# Patient Record
Sex: MALE | Race: WHITE | Employment: FULL TIME | URBAN - METROPOLITAN AREA
[De-identification: names, ages, dates, MRNs, and addresses within clinical notes are randomized per-mention and may not be internally consistent; named-entity substitution may affect disease eponyms.]

---

## 2017-04-10 RX ORDER — PANTOPRAZOLE SODIUM 40 MG/1
TABLET, DELAYED RELEASE ORAL
Qty: 90 TABLET | Refills: 0 | Status: SHIPPED | OUTPATIENT
Start: 2017-04-10 | End: 2017-07-09 | Stop reason: SDUPTHER

## 2017-04-12 ENCOUNTER — TELEPHONE (OUTPATIENT)
Dept: FAMILY MEDICINE CLINIC | Age: 37
End: 2017-04-12

## 2017-04-12 ENCOUNTER — OFFICE VISIT (OUTPATIENT)
Dept: FAMILY MEDICINE CLINIC | Age: 37
End: 2017-04-12

## 2017-04-12 VITALS
BODY MASS INDEX: 28.85 KG/M2 | WEIGHT: 224.8 LBS | HEART RATE: 56 BPM | HEIGHT: 74 IN | SYSTOLIC BLOOD PRESSURE: 108 MMHG | DIASTOLIC BLOOD PRESSURE: 70 MMHG | RESPIRATION RATE: 16 BRPM

## 2017-04-12 DIAGNOSIS — Z00.00 WELL ADULT EXAM: Primary | ICD-10-CM

## 2017-04-12 DIAGNOSIS — F45.8 BRUXISM: ICD-10-CM

## 2017-04-12 DIAGNOSIS — Z86.718 HISTORY OF DVT (DEEP VEIN THROMBOSIS): ICD-10-CM

## 2017-04-12 DIAGNOSIS — G25.81 RLS (RESTLESS LEGS SYNDROME): ICD-10-CM

## 2017-04-12 DIAGNOSIS — M79.10 MYALGIA: Primary | ICD-10-CM

## 2017-04-12 PROCEDURE — 99213 OFFICE O/P EST LOW 20 MIN: CPT | Performed by: FAMILY MEDICINE

## 2017-04-12 RX ORDER — CYCLOBENZAPRINE HCL 10 MG
10 TABLET ORAL NIGHTLY PRN
Qty: 30 TABLET | Refills: 2 | Status: SHIPPED | OUTPATIENT
Start: 2017-04-12 | End: 2017-05-08

## 2017-04-12 ASSESSMENT — ENCOUNTER SYMPTOMS
RESPIRATORY NEGATIVE: 1
GASTROINTESTINAL NEGATIVE: 1
BACK PAIN: 1

## 2017-04-12 ASSESSMENT — PATIENT HEALTH QUESTIONNAIRE - PHQ9
SUM OF ALL RESPONSES TO PHQ QUESTIONS 1-9: 0
1. LITTLE INTEREST OR PLEASURE IN DOING THINGS: 0
2. FEELING DOWN, DEPRESSED OR HOPELESS: 0
SUM OF ALL RESPONSES TO PHQ9 QUESTIONS 1 & 2: 0

## 2017-05-08 ENCOUNTER — TELEPHONE (OUTPATIENT)
Dept: FAMILY MEDICINE CLINIC | Age: 37
End: 2017-05-08

## 2017-05-08 RX ORDER — TEMAZEPAM 15 MG/1
15 CAPSULE ORAL NIGHTLY PRN
Qty: 30 CAPSULE | Refills: 0 | Status: SHIPPED | OUTPATIENT
Start: 2017-05-08 | End: 2017-06-14 | Stop reason: SDUPTHER

## 2017-05-11 LAB
ALBUMIN SERPL-MCNC: 4.1 G/DL (ref 3.2–5.3)
ALK PHOSPHATASE: 81 IU/L (ref 35–121)
ALT SERPL-CCNC: 15 IU/L (ref 5–59)
ANION GAP SERPL CALCULATED.3IONS-SCNC: 15 MMOL/L
AST SERPL-CCNC: 20 IU/L (ref 10–42)
BILIRUB SERPL-MCNC: 1.1 MG/DL (ref 0.2–1.3)
BUN BLDV-MCNC: 12 MG/DL (ref 10–20)
CALCIUM SERPL-MCNC: 9.3 MG/DL (ref 8.7–10.8)
CHLORIDE BLD-SCNC: 102 MMOL/L (ref 95–111)
CHOLESTEROL/HDL RATIO: 3.5
CHOLESTEROL: 168 MG/DL
CO2: 27 MMOL/L (ref 21–32)
CREAT SERPL-MCNC: 0.9 MG/DL (ref 0.5–1.3)
EGFR AFRICAN AMERICAN: 115
EGFR IF NONAFRICAN AMERICAN: 95
GLUCOSE: 88 MG/DL (ref 70–100)
HDLC SERPL-MCNC: 48 MG/DL (ref 40–60)
LDL CHOLESTEROL CALCULATED: 101 MG/DL
LDL/HDL RATIO: 2.1
POTASSIUM SERPL-SCNC: 4.7 MMOL/L (ref 3.5–5.4)
SODIUM BLD-SCNC: 139 MMOL/L (ref 134–147)
TOTAL PROTEIN: 6.8 G/DL (ref 5.8–8)
TRIGL SERPL-MCNC: 95 MG/DL
TSH SERPL DL<=0.05 MIU/L-ACNC: 1.98 UIU/ML (ref 0.4–4.4)
VLDLC SERPL CALC-MCNC: 19 MG/DL

## 2017-06-14 ENCOUNTER — OFFICE VISIT (OUTPATIENT)
Dept: FAMILY MEDICINE CLINIC | Age: 37
End: 2017-06-14

## 2017-06-14 VITALS
SYSTOLIC BLOOD PRESSURE: 124 MMHG | HEIGHT: 74 IN | BODY MASS INDEX: 28.36 KG/M2 | WEIGHT: 221 LBS | HEART RATE: 68 BPM | RESPIRATION RATE: 16 BRPM | DIASTOLIC BLOOD PRESSURE: 80 MMHG

## 2017-06-14 DIAGNOSIS — Z00.00 WELL ADULT HEALTH CHECK: Primary | ICD-10-CM

## 2017-06-14 PROCEDURE — 99395 PREV VISIT EST AGE 18-39: CPT | Performed by: FAMILY MEDICINE

## 2017-06-14 RX ORDER — TEMAZEPAM 15 MG/1
15 CAPSULE ORAL NIGHTLY PRN
Qty: 90 CAPSULE | Refills: 0 | Status: SHIPPED | OUTPATIENT
Start: 2017-06-14 | End: 2017-12-15 | Stop reason: SDUPTHER

## 2017-06-14 ASSESSMENT — ENCOUNTER SYMPTOMS
RESPIRATORY NEGATIVE: 1
GASTROINTESTINAL NEGATIVE: 1

## 2017-07-10 RX ORDER — PANTOPRAZOLE SODIUM 40 MG/1
TABLET, DELAYED RELEASE ORAL
Qty: 90 TABLET | Refills: 3 | Status: SHIPPED | OUTPATIENT
Start: 2017-07-10 | End: 2018-11-19 | Stop reason: SDUPTHER

## 2017-09-14 ENCOUNTER — TELEPHONE (OUTPATIENT)
Dept: FAMILY MEDICINE CLINIC | Age: 37
End: 2017-09-14

## 2017-12-15 DIAGNOSIS — L65.9 HAIR LOSS: ICD-10-CM

## 2017-12-15 RX ORDER — FINASTERIDE 5 MG/1
5 TABLET, FILM COATED ORAL DAILY
Qty: 90 TABLET | Refills: 3 | Status: SHIPPED | OUTPATIENT
Start: 2017-12-15 | End: 2018-11-19 | Stop reason: SDUPTHER

## 2017-12-15 RX ORDER — TEMAZEPAM 15 MG/1
15 CAPSULE ORAL NIGHTLY PRN
Qty: 90 CAPSULE | Refills: 1 | Status: SHIPPED | OUTPATIENT
Start: 2017-12-15 | End: 2019-10-11 | Stop reason: SDUPTHER

## 2018-11-13 ENCOUNTER — TELEPHONE (OUTPATIENT)
Dept: FAMILY MEDICINE CLINIC | Age: 38
End: 2018-11-13

## 2018-11-13 DIAGNOSIS — Z00.00 WELL ADULT HEALTH CHECK: Primary | ICD-10-CM

## 2018-11-15 LAB
AVERAGE GLUCOSE: NORMAL
CHOLESTEROL, TOTAL: 166 MG/DL
CHOLESTEROL/HDL RATIO: NORMAL
HBA1C MFR BLD: 5.3 %
HDLC SERPL-MCNC: 56 MG/DL (ref 35–70)
LDL CHOLESTEROL CALCULATED: 99 MG/DL (ref 0–160)
TRIGL SERPL-MCNC: 54 MG/DL
TSH SERPL DL<=0.05 MIU/L-ACNC: 1.76 UIU/ML
VLDLC SERPL CALC-MCNC: NORMAL MG/DL

## 2018-11-19 ENCOUNTER — OFFICE VISIT (OUTPATIENT)
Dept: FAMILY MEDICINE CLINIC | Age: 38
End: 2018-11-19
Payer: COMMERCIAL

## 2018-11-19 VITALS
DIASTOLIC BLOOD PRESSURE: 70 MMHG | BODY MASS INDEX: 25.93 KG/M2 | SYSTOLIC BLOOD PRESSURE: 120 MMHG | RESPIRATION RATE: 12 BRPM | HEIGHT: 74 IN | OXYGEN SATURATION: 99 % | WEIGHT: 202 LBS | HEART RATE: 60 BPM

## 2018-11-19 DIAGNOSIS — F51.01 PRIMARY INSOMNIA: ICD-10-CM

## 2018-11-19 DIAGNOSIS — Z00.00 WELL ADULT HEALTH CHECK: Primary | ICD-10-CM

## 2018-11-19 PROCEDURE — 99395 PREV VISIT EST AGE 18-39: CPT | Performed by: FAMILY MEDICINE

## 2018-11-19 RX ORDER — TEMAZEPAM 15 MG/1
15 CAPSULE ORAL NIGHTLY PRN
Qty: 90 CAPSULE | Refills: 0 | Status: SHIPPED | OUTPATIENT
Start: 2018-11-19 | End: 2019-10-11 | Stop reason: SDUPTHER

## 2018-11-19 RX ORDER — PANTOPRAZOLE SODIUM 40 MG/1
TABLET, DELAYED RELEASE ORAL
Qty: 90 TABLET | Refills: 3 | Status: SHIPPED | OUTPATIENT
Start: 2018-11-19 | End: 2019-10-11

## 2018-11-19 RX ORDER — FINASTERIDE 5 MG/1
5 TABLET, FILM COATED ORAL DAILY
Qty: 90 TABLET | Refills: 3 | Status: SHIPPED | OUTPATIENT
Start: 2018-11-19 | End: 2019-10-11 | Stop reason: SDUPTHER

## 2018-11-19 ASSESSMENT — PATIENT HEALTH QUESTIONNAIRE - PHQ9
2. FEELING DOWN, DEPRESSED OR HOPELESS: 0
SUM OF ALL RESPONSES TO PHQ QUESTIONS 1-9: 0
SUM OF ALL RESPONSES TO PHQ QUESTIONS 1-9: 0
1. LITTLE INTEREST OR PLEASURE IN DOING THINGS: 0
SUM OF ALL RESPONSES TO PHQ9 QUESTIONS 1 & 2: 0

## 2018-11-19 ASSESSMENT — ENCOUNTER SYMPTOMS
RESPIRATORY NEGATIVE: 1
GASTROINTESTINAL NEGATIVE: 1

## 2019-10-02 ENCOUNTER — TELEPHONE (OUTPATIENT)
Dept: FAMILY MEDICINE CLINIC | Age: 39
End: 2019-10-02

## 2019-10-11 ENCOUNTER — OFFICE VISIT (OUTPATIENT)
Dept: FAMILY MEDICINE CLINIC | Age: 39
End: 2019-10-11
Payer: COMMERCIAL

## 2019-10-11 VITALS
BODY MASS INDEX: 25.6 KG/M2 | SYSTOLIC BLOOD PRESSURE: 118 MMHG | DIASTOLIC BLOOD PRESSURE: 60 MMHG | HEIGHT: 74 IN | RESPIRATION RATE: 12 BRPM | WEIGHT: 199.5 LBS | HEART RATE: 56 BPM

## 2019-10-11 DIAGNOSIS — Z00.00 WELL ADULT HEALTH CHECK: Primary | ICD-10-CM

## 2019-10-11 DIAGNOSIS — F51.01 PRIMARY INSOMNIA: ICD-10-CM

## 2019-10-11 PROCEDURE — 99395 PREV VISIT EST AGE 18-39: CPT | Performed by: FAMILY MEDICINE

## 2019-10-11 RX ORDER — FAMOTIDINE 20 MG/1
20 TABLET, FILM COATED ORAL 2 TIMES DAILY
COMMUNITY
End: 2022-04-19

## 2019-10-11 RX ORDER — TEMAZEPAM 15 MG/1
15 CAPSULE ORAL NIGHTLY PRN
Qty: 90 CAPSULE | Refills: 0 | Status: SHIPPED | OUTPATIENT
Start: 2019-10-11 | End: 2020-01-09

## 2019-10-11 RX ORDER — FINASTERIDE 5 MG/1
5 TABLET, FILM COATED ORAL DAILY
Qty: 90 TABLET | Refills: 3 | Status: SHIPPED | OUTPATIENT
Start: 2019-10-11 | End: 2020-10-02

## 2019-10-11 ASSESSMENT — PATIENT HEALTH QUESTIONNAIRE - PHQ9
SUM OF ALL RESPONSES TO PHQ QUESTIONS 1-9: 0
SUM OF ALL RESPONSES TO PHQ9 QUESTIONS 1 & 2: 0
2. FEELING DOWN, DEPRESSED OR HOPELESS: 0
1. LITTLE INTEREST OR PLEASURE IN DOING THINGS: 0
SUM OF ALL RESPONSES TO PHQ QUESTIONS 1-9: 0

## 2019-10-11 ASSESSMENT — ENCOUNTER SYMPTOMS
RESPIRATORY NEGATIVE: 1
GASTROINTESTINAL NEGATIVE: 1

## 2020-07-29 ENCOUNTER — TELEPHONE (OUTPATIENT)
Dept: FAMILY MEDICINE CLINIC | Age: 40
End: 2020-07-29

## 2020-07-29 NOTE — TELEPHONE ENCOUNTER
Patient on VM wanting provider to know that he will be having a sclera buckle done this Friday ( did not leave side surgery being performed on) 7/31/2020. Noted for chart .

## 2020-10-02 RX ORDER — FINASTERIDE 5 MG/1
5 TABLET, FILM COATED ORAL DAILY
Qty: 90 TABLET | Refills: 3 | Status: SHIPPED | OUTPATIENT
Start: 2020-10-02

## 2020-11-23 ENCOUNTER — OFFICE VISIT (OUTPATIENT)
Dept: FAMILY MEDICINE CLINIC | Age: 40
End: 2020-11-23
Payer: COMMERCIAL

## 2020-11-23 VITALS
WEIGHT: 189.8 LBS | DIASTOLIC BLOOD PRESSURE: 78 MMHG | HEART RATE: 66 BPM | TEMPERATURE: 96.8 F | OXYGEN SATURATION: 99 % | BODY MASS INDEX: 24.36 KG/M2 | SYSTOLIC BLOOD PRESSURE: 112 MMHG | HEIGHT: 74 IN

## 2020-11-23 PROCEDURE — 99396 PREV VISIT EST AGE 40-64: CPT | Performed by: FAMILY MEDICINE

## 2020-11-23 RX ORDER — TEMAZEPAM 15 MG/1
15 CAPSULE ORAL NIGHTLY PRN
COMMUNITY
End: 2020-11-23 | Stop reason: SDUPTHER

## 2020-11-23 RX ORDER — TEMAZEPAM 15 MG/1
15 CAPSULE ORAL NIGHTLY PRN
Qty: 90 CAPSULE | Refills: 1 | Status: SHIPPED | OUTPATIENT
Start: 2020-11-23 | End: 2022-09-07 | Stop reason: SDUPTHER

## 2020-11-23 ASSESSMENT — PATIENT HEALTH QUESTIONNAIRE - PHQ9
2. FEELING DOWN, DEPRESSED OR HOPELESS: 0
SUM OF ALL RESPONSES TO PHQ9 QUESTIONS 1 & 2: 0
SUM OF ALL RESPONSES TO PHQ QUESTIONS 1-9: 0
1. LITTLE INTEREST OR PLEASURE IN DOING THINGS: 0
SUM OF ALL RESPONSES TO PHQ QUESTIONS 1-9: 0
SUM OF ALL RESPONSES TO PHQ QUESTIONS 1-9: 0

## 2020-11-23 ASSESSMENT — ENCOUNTER SYMPTOMS
RESPIRATORY NEGATIVE: 1
GASTROINTESTINAL NEGATIVE: 1

## 2020-11-23 NOTE — PROGRESS NOTES
negative. Objective:   Physical Exam  Vitals signs and nursing note reviewed. Constitutional:       General: He is not in acute distress. Appearance: Normal appearance. He is well-developed. HENT:      Head: Normocephalic and atraumatic. Right Ear: Tympanic membrane normal.      Left Ear: Tympanic membrane normal.   Eyes:      Conjunctiva/sclera: Conjunctivae normal.   Neck:      Musculoskeletal: Neck supple. Cardiovascular:      Rate and Rhythm: Normal rate and regular rhythm. Heart sounds: Normal heart sounds. No murmur. Pulmonary:      Effort: Pulmonary effort is normal.      Breath sounds: Normal breath sounds. No wheezing, rhonchi or rales. Abdominal:      General: There is no distension. Skin:     General: Skin is warm and dry. Findings: No rash (on exposed surfaces). Neurological:      General: No focal deficit present. Mental Status: He is alert. Psychiatric:         Attention and Perception: Attention normal.         Mood and Affect: Mood normal.         Speech: Speech normal.         Behavior: Behavior normal. Behavior is cooperative. Thought Content: Thought content normal.         Judgment: Judgment normal.         Assessment:       Diagnosis Orders   1. Well adult health check     2.  Primary insomnia  temazepam (RESTORIL) 15 MG capsule           Plan:      -  Healthy lifestyle discussed  -  Chronic medical problems stable  -  Continue current medications  -  RTO annually        Italia Esparza DO

## 2021-01-14 ENCOUNTER — TELEPHONE (OUTPATIENT)
Dept: FAMILY MEDICINE CLINIC | Age: 41
End: 2021-01-14

## 2021-01-14 RX ORDER — PANTOPRAZOLE SODIUM 40 MG/1
TABLET, DELAYED RELEASE ORAL
Qty: 90 TABLET | Refills: 3 | Status: SHIPPED | OUTPATIENT
Start: 2021-01-14 | End: 2021-09-10 | Stop reason: SDUPTHER

## 2021-01-14 NOTE — TELEPHONE ENCOUNTER
Patient calling to state he had an EGD done at [facility] yesterday and results are available in care everywhere. Requesting Rx for PPI to [facility]. Was on Protonix in the past and tolerated it well.   Please advise

## 2021-04-29 ENCOUNTER — NURSE TRIAGE (OUTPATIENT)
Dept: OTHER | Facility: CLINIC | Age: 41
End: 2021-04-29

## 2021-04-29 NOTE — TELEPHONE ENCOUNTER
Received call from MANOHAR at [facility]) [address] [facility] [employer] Complaint. Brief description of triage: Chest pain for awhile, workup was done in January but now pain is back. No new or worsening symptoms. Patient states the pain is about the same as when he was checked out for it before. Triage not needed. Care advice provided, patient verbalizes understanding; denies any other questions or concerns; instructed to call back for any new or worsening symptoms. Writer provided warm transfer to Maryann at [facility] for appointment scheduling. Attention Provider: Thank you for allowing me to participate in the care of your patient. The patient was connected to triage in response to information provided to the ECC. Please do not respond through this encounter as the response is not directed to a shared pool. Reason for Disposition   Caller has already spoken with the PCP (or office), and has no further questions    Answer Assessment - Initial Assessment Questions  1. LOCATION: \"Where does it hurt? \"        Mid-sternal chest pain - ribs 4-5    2. RADIATION: \"Does the pain go anywhere else? \" (e.g., into neck, jaw, arms, back)      No    3. ONSET: \"When did the chest pain begin? \" (Minutes, hours or days)       Started in January and had CT, etc. to get checked out, now pain is back over the last month    4. PATTERN \"Does the pain come and go, or has it been constant since it started? \"  \"Does it get worse with exertion? \"       N/A    5. DURATION: \"How long does it last\" (e.g., seconds, minutes, hours)      N/A    6. SEVERITY: \"How bad is the pain? \"  (e.g., Scale 1-10; mild, moderate, or severe)     - MILD (1-3): doesn't interfere with normal activities      - MODERATE (4-7): interferes with normal activities or awakens from sleep     - SEVERE (8-10): excruciating pain, unable to do any normal activities        N/A    7. CARDIAC RISK FACTORS: \"Do you have any history of heart problems or risk factors for heart disease? \" (e.g., angina, prior heart attack; diabetes, high blood pressure, high cholesterol, smoker, or strong family history of heart disease)      N/A    8. PULMONARY RISK FACTORS: \"Do you have any history of lung disease? \"  (e.g., blood clots in lung, asthma, emphysema, birth control pills)      History of DVT    9. CAUSE: \"What do you think is causing the chest pain? \"      Workup done earlier this year, unsure of cause     10. OTHER SYMPTOMS: \"Do you have any other symptoms? \" (e.g., dizziness, nausea, vomiting, sweating, fever, difficulty breathing, cough)        History of GERD    11. PREGNANCY: \"Is there any chance you are pregnant? \" \"When was your last menstrual period? \"        N/A    Protocols used: NO CONTACT OR DUPLICATE CONTACT CALL-ADULT-[place], CHEST PAIN-ADULT-[place]

## 2021-05-03 ENCOUNTER — VIRTUAL VISIT (OUTPATIENT)
Dept: FAMILY MEDICINE CLINIC | Age: 41
End: 2021-05-03
Payer: COMMERCIAL

## 2021-05-03 DIAGNOSIS — R07.89 CHEST PRESSURE: Primary | ICD-10-CM

## 2021-05-03 DIAGNOSIS — F41.9 ANXIETY: ICD-10-CM

## 2021-05-03 DIAGNOSIS — K21.00 GASTROESOPHAGEAL REFLUX DISEASE WITH ESOPHAGITIS WITHOUT HEMORRHAGE: ICD-10-CM

## 2021-05-03 DIAGNOSIS — R06.02 SOB (SHORTNESS OF BREATH): ICD-10-CM

## 2021-05-03 PROCEDURE — 99214 OFFICE O/P EST MOD 30 MIN: CPT | Performed by: FAMILY MEDICINE

## 2021-05-03 NOTE — PROGRESS NOTES
Apple Noble (:  1980) is a 38 y.o. male,Established patient, here for evaluation of the following chief complaint(s): Chest Pain, Shortness of Breath, and Gastroesophageal Reflux      SUBJECTIVE/OBJECTIVE:  HPI:    Chief Complaint   Patient presents with    Chest Pain    Shortness of Breath    Gastroesophageal Reflux     Pt presents today for ED follow up. Pt has been struggling with some chest pain, pressure, and SOB for the last few months off and on. Hx of PE, he is compliant with his Eliquis. Work up negative including CXR, labs, CTA. Pt had Cardio fellow perform echo, wnl. Pt recently had an EGD performed which did show mild esophagitis and gastritis. H pylori negative. On Protonix which seems to be helping. Admittedly anxious and under a lot of stress at work recently. Patient Active Problem List   Diagnosis    DVT, lower extremity (HCC)    GERD (gastroesophageal reflux disease)    Long term current use of anticoagulant therapy    Chronic intractable headache    Vertigo, benign positional    Eye strain, bilateral     Past Surgical History:   Procedure Laterality Date    ANUS SURGERY      Fissure    APPENDECTOMY  2/26/15    COLONOSCOPY       Social History     Tobacco Use    Smoking status: Never Smoker    Smokeless tobacco: Never Used   Substance Use Topics    Alcohol use: No    Drug use: No         Review of Systems   Constitutional: Negative. HENT: Negative. Respiratory: Positive for chest tightness and shortness of breath. Cardiovascular: Negative. Gastrointestinal: Negative. Musculoskeletal: Negative. Psychiatric/Behavioral: The patient is nervous/anxious. All other systems reviewed and are negative. No flowsheet data found. Physical Exam  Constitutional:       General: He is not in acute distress. Appearance: Normal appearance. He is well-developed. He is not ill-appearing.    HENT:      Head: Normocephalic and atraumatic. Right Ear: External ear normal.      Left Ear: External ear normal.   Eyes:      Conjunctiva/sclera: Conjunctivae normal.   Pulmonary:      Effort: Pulmonary effort is normal. No respiratory distress. Skin:     Findings: No rash (on exposed surfaces). Neurological:      Mental Status: He is alert and oriented to person, place, and time. Psychiatric:         Mood and Affect: Mood normal.         Behavior: Behavior normal.         Thought Content: Thought content normal.         Judgment: Judgment normal.       ASSESSMENT/PLAN:  1. Chest pressure  2. SOB (shortness of breath)  3. Gastroesophageal reflux disease with esophagitis without hemorrhage  4. Anxiety    -  Chronic medical problems stable  -  Continue current medications  -  Follow up with specialists as scheduled  -  Records in Epic reviewed, work up to date wnl including echo, CTA, labs, CXR  -  Discussed PFT, stress test, event monitor for which he declines  -  Will monitor symptoms for now    Return if symptoms worsen or fail to improve. Keith Brown, was evaluated through a synchronous (real-time) audio-video encounter. The patient (or guardian if applicable) is aware that this is a billable service. Verbal consent to proceed has been obtained within the past 12 months. The visit was conducted pursuant to the emergency declaration under the [address] [facility], [address] [place] [address] authority and the [place] and [employer] General Act. Patient identification was verified, and a caregiver was present when appropriate. The patient was located in a state where the provider was credentialed to provide care. An electronic signature was used to authenticate this note.     --So Horta DO

## 2021-05-04 ASSESSMENT — ENCOUNTER SYMPTOMS
GASTROINTESTINAL NEGATIVE: 1
SHORTNESS OF BREATH: 1
CHEST TIGHTNESS: 1

## 2021-09-10 RX ORDER — PANTOPRAZOLE SODIUM 40 MG/1
TABLET, DELAYED RELEASE ORAL
Qty: 90 TABLET | Refills: 3 | Status: SHIPPED | OUTPATIENT
Start: 2021-09-10 | End: 2022-08-16

## 2021-09-10 NOTE — TELEPHONE ENCOUNTER
Pt called office stating he had a change with insurance and needs his scripts to go to [facility] now. Pt requests a refill of Pantoprazole. Refill if appropriate.

## 2021-11-15 ENCOUNTER — PATIENT MESSAGE (OUTPATIENT)
Dept: FAMILY MEDICINE CLINIC | Age: 41
End: 2021-11-15

## 2021-11-15 ENCOUNTER — NURSE ONLY (OUTPATIENT)
Dept: LAB | Age: 41
End: 2021-11-15

## 2021-11-15 DIAGNOSIS — R07.89 CHEST PRESSURE: Primary | ICD-10-CM

## 2021-11-15 DIAGNOSIS — R07.89 CHEST PRESSURE: ICD-10-CM

## 2021-11-15 LAB
C-REACTIVE PROTEIN: < 0.3 MG/DL (ref 0–1)
TROPONIN T: < 0.01 NG/ML

## 2021-11-15 NOTE — TELEPHONE ENCOUNTER
From: Viraj Freeman  To: Dr. Basilia Lamb  Sent: 11/15/2021 9:11 AM EST  Subject: Non-Urgent Medical Question    Good morning Dr. Nilesh Deutsch,  Sorry to bother you again. I recently received my booster COVID (Joao Isidro) vaccine last Tue and on Friday developed pretty intense chest pain/discomfort, nausea, vertigo. I chalked it up to just side effects of the vaccine and used IBU/APAP. On Saturday it was still pretty intense off and on. I was thinking back to my 2nd dose and had similar chest discomfort for some time, that's why I first called about the lingering chest pain back earlier this year possibly. Has anyone else mentioned this? It has gotten less intense since yesterday and I assume it will resolve with time. My biggest concern will be to rule out acute myocarditis as I wish to keep exercising on a [employer]. I was working Saturday and stopped in the ED and Dr Yahir Lai said they usually do a troponin, CRP and ECG. ..if all are negative then it's a good chance, no myocarditis. He also did a quick echo of my heart and there were no signs of hypokinesis. Do you think it is worth getting some lab work to rule out myocarditis or you have seen this with other patients and it's ok to go back to exercising.  Thanks, Haritha Salmeron

## 2021-12-30 ENCOUNTER — HOSPITAL ENCOUNTER (OUTPATIENT)
Dept: PULMONOLOGY | Age: 41
Discharge: HOME | End: 2021-12-30
Payer: COMMERCIAL

## 2021-12-30 DIAGNOSIS — R06.02 SOB (SHORTNESS OF BREATH): ICD-10-CM

## 2021-12-30 PROCEDURE — 94726 PLETHYSMOGRAPHY LUNG VOLUMES: CPT

## 2021-12-30 PROCEDURE — 94060 EVALUATION OF WHEEZING: CPT

## 2021-12-30 PROCEDURE — 94729 DIFFUSING CAPACITY: CPT

## 2022-03-15 ENCOUNTER — TELEPHONE (OUTPATIENT)
Dept: FAMILY MEDICINE CLINIC | Age: 42
End: 2022-03-15

## 2022-03-15 DIAGNOSIS — R51.9 CHRONIC NONINTRACTABLE HEADACHE, UNSPECIFIED HEADACHE TYPE: Primary | ICD-10-CM

## 2022-03-15 DIAGNOSIS — G89.29 CHRONIC NONINTRACTABLE HEADACHE, UNSPECIFIED HEADACHE TYPE: Primary | ICD-10-CM

## 2022-03-15 NOTE — TELEPHONE ENCOUNTER
Referral faxed to Dr. Vallejo [employer] office at [phone]. Called and updated the patient that referral was faxed and that they will call him to schedule. He voiced understanding. verbal cues/1 person assist

## 2022-04-01 ENCOUNTER — TELEPHONE (OUTPATIENT)
Dept: FAMILY MEDICINE CLINIC | Age: 42
End: 2022-04-01

## 2022-04-01 DIAGNOSIS — Z11.52 ENCOUNTER FOR SCREENING FOR COVID-19: Primary | ICD-10-CM

## 2022-04-01 NOTE — TELEPHONE ENCOUNTER
Patient calling and requesting to have COVID test done at [facility] next Wednesday evening for travel. Leaving on 4/9/22 for [place]. Please advise.

## 2022-04-06 ENCOUNTER — HOSPITAL ENCOUNTER (OUTPATIENT)
Age: 42
Setting detail: SPECIMEN
Discharge: HOME | End: 2022-04-06
Payer: COMMERCIAL

## 2022-04-06 DIAGNOSIS — Z11.52 ENCOUNTER FOR SCREENING FOR COVID-19: ICD-10-CM

## 2022-04-06 PROCEDURE — 87636 SARSCOV2 & INF A&B AMP PRB: CPT

## 2022-04-06 NOTE — ED NOTES
covid swab obtained and sent to lab.  Tolerated without complaint       Laura Noriega, ARIADNE  27/35/35 [id number]

## 2022-04-07 LAB
INFLUENZA A: NOT DETECTED
INFLUENZA B: NOT DETECTED
SARS-COV-2 RNA, RT PCR: NOT DETECTED

## 2022-04-19 ENCOUNTER — OFFICE VISIT (OUTPATIENT)
Dept: FAMILY MEDICINE CLINIC | Age: 42
End: 2022-04-19
Payer: COMMERCIAL

## 2022-04-19 VITALS
WEIGHT: 196.9 LBS | HEIGHT: 71 IN | RESPIRATION RATE: 13 BRPM | DIASTOLIC BLOOD PRESSURE: 80 MMHG | BODY MASS INDEX: 27.56 KG/M2 | SYSTOLIC BLOOD PRESSURE: 120 MMHG | HEART RATE: 59 BPM

## 2022-04-19 DIAGNOSIS — M79.89 MASS OF SOFT TISSUE OF NECK: Primary | ICD-10-CM

## 2022-04-19 DIAGNOSIS — S19.9XXA SOFT TISSUE INJURY OF NECK, INITIAL ENCOUNTER: ICD-10-CM

## 2022-04-19 DIAGNOSIS — E07.89 THYROID PAIN: ICD-10-CM

## 2022-04-19 PROCEDURE — 99213 OFFICE O/P EST LOW 20 MIN: CPT | Performed by: NURSE PRACTITIONER

## 2022-04-19 RX ORDER — FAMOTIDINE 20 MG/1
20 TABLET, FILM COATED ORAL NIGHTLY
COMMUNITY

## 2022-04-19 SDOH — ECONOMIC STABILITY: FOOD INSECURITY: WITHIN THE PAST 12 MONTHS, THE FOOD YOU BOUGHT JUST DIDN'T LAST AND YOU DIDN'T HAVE MONEY TO GET MORE.: NEVER TRUE

## 2022-04-19 SDOH — ECONOMIC STABILITY: FOOD INSECURITY: WITHIN THE PAST 12 MONTHS, YOU WORRIED THAT YOUR FOOD WOULD RUN OUT BEFORE YOU GOT MONEY TO BUY MORE.: NEVER TRUE

## 2022-04-19 ASSESSMENT — SOCIAL DETERMINANTS OF HEALTH (SDOH): HOW HARD IS IT FOR YOU TO PAY FOR THE VERY BASICS LIKE FOOD, HOUSING, MEDICAL CARE, AND HEATING?: NOT HARD AT ALL

## 2022-04-19 ASSESSMENT — PATIENT HEALTH QUESTIONNAIRE - PHQ9
SUM OF ALL RESPONSES TO PHQ QUESTIONS 1-9: 0
SUM OF ALL RESPONSES TO PHQ9 QUESTIONS 1 & 2: 0
2. FEELING DOWN, DEPRESSED OR HOPELESS: 0
SUM OF ALL RESPONSES TO PHQ QUESTIONS 1-9: 0
SUM OF ALL RESPONSES TO PHQ QUESTIONS 1-9: 0
1. LITTLE INTEREST OR PLEASURE IN DOING THINGS: 0
SUM OF ALL RESPONSES TO PHQ QUESTIONS 1-9: 0

## 2022-04-19 ASSESSMENT — ENCOUNTER SYMPTOMS
NAUSEA: 0
SHORTNESS OF BREATH: 0
COUGH: 0
ABDOMINAL PAIN: 0

## 2022-04-19 NOTE — PROGRESS NOTES
Kerrie Kaur (1980) 42 y.o. male here for evaluation of the following chief complaint(s):      HPI:  Chief Complaint   Patient presents with    Neck Pain     soft tissue front left neck, neck with a tightness present for 4 months wose over past 1 week        Ongoing for 4 months. Off and on. TTP off center of thyroid. Denies thyroid tenderness. Left sided soft tissue of neck. On vacation in [place] and flared up worse last Friday. Limited ROM of looking up. Better today. Nausea. Swallowing requiring more effort but denies trouble. Vitals:    04/19/22 1105   BP: 120/80   Pulse: 59   Resp: 13       Patient Active Problem List   Diagnosis    DVT, lower extremity (HCC)    GERD (gastroesophageal reflux disease)    Long term current use of anticoagulant therapy    Chronic intractable headache    Vertigo, benign positional    Eye strain, bilateral       SUBJECTIVE/OBJECTIVE:  Review of Systems   Constitutional: Negative for chills and fever. HENT: Negative. Respiratory: Negative for cough and shortness of breath. Cardiovascular: Negative for chest pain. Gastrointestinal: Negative for abdominal pain and nausea. Musculoskeletal: Positive for neck stiffness. Negative for neck pain. Skin: Negative for rash. Neurological: Negative for dizziness, light-headedness and headaches. Psychiatric/Behavioral: Negative. Physical Exam  HENT:      Head: Normocephalic. Right Ear: Tympanic membrane normal.      Left Ear: Tympanic membrane normal.      Nose: Nose normal.   Eyes:      Pupils: Pupils are equal, round, and reactive to light. Neck:      Thyroid: No thyroid mass, thyromegaly or thyroid tenderness. Vascular: No carotid bruit. Trachea: Trachea normal.     Cardiovascular:      Rate and Rhythm: Normal rate and regular rhythm. Pulses: Normal pulses. Heart sounds: Normal heart sounds.    Pulmonary:      Effort: Pulmonary effort is normal.      Breath sounds: Normal breath sounds. Abdominal:      General: Bowel sounds are normal.      Palpations: Abdomen is soft. Musculoskeletal:         General: Normal range of motion. Cervical back: Normal range of motion and neck supple. Pain with movement and muscular tenderness present. No spinous process tenderness. Normal range of motion. Lymphadenopathy:      Cervical: No cervical adenopathy. Right cervical: No superficial, deep or posterior cervical adenopathy. Left cervical: No superficial, deep or posterior cervical adenopathy. Skin:     General: Skin is warm and dry. Neurological:      Mental Status: He is alert and oriented to person, place, and time. ASSESSMENT/PLAN:   Diagnosis Orders   1. Thyroid pain  US HEAD NECK SOFT TISSUE THYROID   2. Soft tissue injury of neck, initial encounter  US HEAD NECK SOFT TISSUE THYROID         MDM: US Soft Tissue Neck   Appears more MSK/Fascia related   RTO PRN      An electronic signature was used to authenticate this note.     --Greer Goode, APRN - CNP

## 2022-04-21 ENCOUNTER — HOSPITAL ENCOUNTER (OUTPATIENT)
Dept: ULTRASOUND IMAGING | Age: 42
Discharge: HOME | End: 2022-04-21
Payer: COMMERCIAL

## 2022-04-21 DIAGNOSIS — S19.9XXA SOFT TISSUE INJURY OF NECK, INITIAL ENCOUNTER: ICD-10-CM

## 2022-04-21 DIAGNOSIS — E07.89 THYROID PAIN: ICD-10-CM

## 2022-04-21 PROCEDURE — 76536 US EXAM OF HEAD AND NECK: CPT

## 2022-05-24 ENCOUNTER — TELEPHONE (OUTPATIENT)
Dept: FAMILY MEDICINE CLINIC | Age: 42
End: 2022-05-24

## 2022-05-24 NOTE — TELEPHONE ENCOUNTER
Pt called office stating he needs a new diagnosis added to the order for the thyroid US. Says his insurance will not cover. Pt is going to send you a msg through [address] listing the diagnosis they do cover. Pt is thinking \"soft tissue mass of head\" needs to be used. Please advise.

## 2022-05-25 NOTE — TELEPHONE ENCOUNTER
Patient notified and understanding voiced.   He will contact billing department to have claim rebilled

## 2022-06-22 ENCOUNTER — HOSPITAL ENCOUNTER (OUTPATIENT)
Dept: GENERAL RADIOLOGY | Age: 42
Discharge: HOME | End: 2022-06-22
Payer: COMMERCIAL

## 2022-06-22 ENCOUNTER — HOSPITAL ENCOUNTER (OUTPATIENT)
Age: 42
Discharge: HOME | End: 2022-06-22
Payer: COMMERCIAL

## 2022-06-22 DIAGNOSIS — M54.2 NECK PAIN: ICD-10-CM

## 2022-06-22 PROCEDURE — 72040 X-RAY EXAM NECK SPINE 2-3 VW: CPT

## 2022-08-16 RX ORDER — PANTOPRAZOLE SODIUM 40 MG/1
TABLET, DELAYED RELEASE ORAL
Qty: 90 TABLET | Refills: 3 | Status: SHIPPED | OUTPATIENT
Start: 2022-08-16 | End: 2022-09-07 | Stop reason: SDUPTHER

## 2022-08-26 DIAGNOSIS — Z00.00 WELL ADULT HEALTH CHECK: Primary | ICD-10-CM

## 2022-08-29 ENCOUNTER — NURSE ONLY (OUTPATIENT)
Dept: LAB | Age: 42
End: 2022-08-29

## 2022-08-29 DIAGNOSIS — Z00.00 WELL ADULT HEALTH CHECK: ICD-10-CM

## 2022-08-29 LAB
ALBUMIN SERPL-MCNC: 4.2 G/DL (ref 3.5–5.1)
ALP BLD-CCNC: 65 U/L (ref 38–126)
ALT SERPL-CCNC: 14 U/L (ref 11–66)
ANION GAP SERPL CALCULATED.3IONS-SCNC: 10 MEQ/L (ref 8–16)
AST SERPL-CCNC: 18 U/L (ref 5–40)
AVERAGE GLUCOSE: 93 MG/DL (ref 70–126)
BASOPHILS # BLD: 1.2 %
BASOPHILS ABSOLUTE: 0.1 THOU/MM3 (ref 0–0.1)
BILIRUB SERPL-MCNC: 1.4 MG/DL (ref 0.3–1.2)
BUN BLDV-MCNC: 14 MG/DL (ref 7–22)
CALCIUM SERPL-MCNC: 9.2 MG/DL (ref 8.5–10.5)
CHLORIDE BLD-SCNC: 105 MEQ/L (ref 98–111)
CHOLESTEROL, TOTAL: 174 MG/DL (ref 100–199)
CO2: 27 MEQ/L (ref 23–33)
CREAT SERPL-MCNC: 0.8 MG/DL (ref 0.4–1.2)
EOSINOPHIL # BLD: 2 %
EOSINOPHILS ABSOLUTE: 0.1 THOU/MM3 (ref 0–0.4)
ERYTHROCYTE [DISTWIDTH] IN BLOOD BY AUTOMATED COUNT: 13.1 % (ref 11.5–14.5)
ERYTHROCYTE [DISTWIDTH] IN BLOOD BY AUTOMATED COUNT: 40.7 FL (ref 35–45)
GFR SERPL CREATININE-BSD FRML MDRD: > 90 ML/MIN/1.73M2
GLUCOSE BLD-MCNC: 92 MG/DL (ref 70–108)
HBA1C MFR BLD: 5.1 % (ref 4.4–6.4)
HCT VFR BLD CALC: 46.6 % (ref 42–52)
HDLC SERPL-MCNC: 56 MG/DL
HEMOGLOBIN: 15.2 GM/DL (ref 14–18)
IMMATURE GRANS (ABS): 0.01 THOU/MM3 (ref 0–0.07)
IMMATURE GRANULOCYTES: 0.2 %
LDL CHOLESTEROL CALCULATED: 107 MG/DL
LYMPHOCYTES # BLD: 46.3 %
LYMPHOCYTES ABSOLUTE: 2.7 THOU/MM3 (ref 1–4.8)
MCH RBC QN AUTO: 27.9 PG (ref 26–33)
MCHC RBC AUTO-ENTMCNC: 32.6 GM/DL (ref 32.2–35.5)
MCV RBC AUTO: 85.7 FL (ref 80–94)
MONOCYTES # BLD: 9.7 %
MONOCYTES ABSOLUTE: 0.6 THOU/MM3 (ref 0.4–1.3)
NUCLEATED RED BLOOD CELLS: 0 /100 WBC
PLATELET # BLD: 263 THOU/MM3 (ref 130–400)
PMV BLD AUTO: 10.7 FL (ref 9.4–12.4)
POTASSIUM SERPL-SCNC: 4.7 MEQ/L (ref 3.5–5.2)
RBC # BLD: 5.44 MILL/MM3 (ref 4.7–6.1)
SEG NEUTROPHILS: 40.6 %
SEGMENTED NEUTROPHILS ABSOLUTE COUNT: 2.4 THOU/MM3 (ref 1.8–7.7)
SODIUM BLD-SCNC: 142 MEQ/L (ref 135–145)
TOTAL PROTEIN: 6.9 G/DL (ref 6.1–8)
TRIGL SERPL-MCNC: 56 MG/DL (ref 0–199)
TSH SERPL DL<=0.05 MIU/L-ACNC: 1.23 UIU/ML (ref 0.4–4.2)
WBC # BLD: 5.9 THOU/MM3 (ref 4.8–10.8)

## 2022-09-07 ENCOUNTER — OFFICE VISIT (OUTPATIENT)
Dept: FAMILY MEDICINE CLINIC | Age: 42
End: 2022-09-07
Payer: COMMERCIAL

## 2022-09-07 VITALS
RESPIRATION RATE: 12 BRPM | SYSTOLIC BLOOD PRESSURE: 126 MMHG | BODY MASS INDEX: 25.42 KG/M2 | WEIGHT: 198.1 LBS | DIASTOLIC BLOOD PRESSURE: 72 MMHG | HEIGHT: 74 IN | HEART RATE: 64 BPM

## 2022-09-07 DIAGNOSIS — R13.10 DYSPHAGIA, UNSPECIFIED TYPE: ICD-10-CM

## 2022-09-07 DIAGNOSIS — E07.89 THYROID PAIN: ICD-10-CM

## 2022-09-07 DIAGNOSIS — Z00.00 WELL ADULT HEALTH CHECK: Primary | ICD-10-CM

## 2022-09-07 DIAGNOSIS — F51.01 PRIMARY INSOMNIA: ICD-10-CM

## 2022-09-07 DIAGNOSIS — K21.9 GASTROESOPHAGEAL REFLUX DISEASE, UNSPECIFIED WHETHER ESOPHAGITIS PRESENT: ICD-10-CM

## 2022-09-07 PROCEDURE — 99214 OFFICE O/P EST MOD 30 MIN: CPT | Performed by: FAMILY MEDICINE

## 2022-09-07 RX ORDER — PANTOPRAZOLE SODIUM 40 MG/1
TABLET, DELAYED RELEASE ORAL
Qty: 90 TABLET | Refills: 3 | Status: SHIPPED | OUTPATIENT
Start: 2022-09-07

## 2022-09-07 RX ORDER — TEMAZEPAM 15 MG/1
15 CAPSULE ORAL NIGHTLY PRN
Qty: 90 CAPSULE | Refills: 1 | Status: SHIPPED | OUTPATIENT
Start: 2022-09-07 | End: 2023-03-06

## 2022-09-07 NOTE — PROGRESS NOTES
B Complex-Biotin-FA (HM VITAMIN B100 COMPLEX PO) Take by mouth Yes Historical Provider, MD   apixaban (ELIQUIS) 2.5 MG TABS tablet Take 1 tablet by mouth 2 times daily Yes Leah Davis DO   ibuprofen (ADVIL;MOTRIN) 600 MG tablet Take 600 mg by mouth every 6 hours as needed for Pain Yes Historical Provider, MD        No Known Allergies    Past Medical History:   Diagnosis Date    DVT (deep venous thrombosis) (HCC)     GERD (gastroesophageal reflux disease)     Headache     nigraines    Orthostasis     Reflux        Past Surgical History:   Procedure Laterality Date    ANUS SURGERY      Fissure    APPENDECTOMY  2/26/15    COLONOSCOPY         Social History     Socioeconomic History    Marital status:      Spouse name: Not on file    Number of children: Not on file    Years of education: Not on file    Highest education level: Not on file   Occupational History    Not on file   Tobacco Use    Smoking status: Never    Smokeless tobacco: Never   Vaping Use    Vaping Use: Never used   Substance and Sexual Activity    Alcohol use: No    Drug use: No    Sexual activity: Not on file   Other Topics Concern    Not on file   Social History Narrative    Not on file     Social Determinants of Health     Financial Resource Strain: Low Risk     Difficulty of Paying Living Expenses: Not hard at all   Food Insecurity: No Food Insecurity    Worried About Running Out of Food in the Last Year: Never true    Ran Out of Food in the Last Year: Never true   Transportation Needs: Not on file   Physical Activity: Not on file   Stress: Not on file   Social Connections: Not on file   Intimate Partner Violence: Not on file   Housing Stability: Not on file        Family History   Problem Relation Age of Onset    Other Mother         Factor 5 Liden and Insomnia    High Cholesterol Mother     High Cholesterol Father     Cancer Maternal Aunt         skin     Colon Cancer Neg Hx     Breast Cancer Neg Hx        ADVANCE DIRECTIVE: N, <no information>    Vitals:    09/07/22 1516   BP: 126/72   Site: Left Upper Arm   Position: Sitting   Cuff Size: Large Adult   Pulse: 64   Resp: 12   Weight: 198 lb 1.6 oz (89.9 kg)   Height: 6' 2\" (1.88 m)     Estimated body mass index is 25.43 kg/m² as calculated from the following:    Height as of this encounter: 6' 2\" (1.88 m). Weight as of this encounter: 198 lb 1.6 oz (89.9 kg). Physical Exam  Vitals and nursing note reviewed. Constitutional:       General: He is not in acute distress. Appearance: Normal appearance. He is well-developed. HENT:      Head: Normocephalic and atraumatic. Right Ear: Tympanic membrane normal.      Left Ear: Tympanic membrane normal.   Eyes:      Conjunctiva/sclera: Conjunctivae normal.   Cardiovascular:      Rate and Rhythm: Normal rate and regular rhythm. Heart sounds: Normal heart sounds. No murmur heard. Pulmonary:      Effort: Pulmonary effort is normal.      Breath sounds: Normal breath sounds. No wheezing, rhonchi or rales. Abdominal:      General: There is no distension. Musculoskeletal:      Cervical back: Neck supple. Skin:     General: Skin is warm and dry. Findings: No rash (on exposed surfaces). Neurological:      General: No focal deficit present. Mental Status: He is alert. Psychiatric:         Attention and Perception: Attention normal.         Mood and Affect: Mood normal.         Speech: Speech normal.         Behavior: Behavior normal. Behavior is cooperative. Thought Content: Thought content normal.         Judgment: Judgment normal.       No flowsheet data found.     Lab Results   Component Value Date/Time    CHOL 174 08/29/2022 08:49 AM    CHOL 166 11/15/2018 12:00 AM    CHOL 168 05/10/2017 07:05 AM    CHOL 175 05/23/2014 12:00 AM    TRIG 56 08/29/2022 08:49 AM    TRIG 54 11/15/2018 12:00 AM    TRIG 95 05/10/2017 07:05 AM    HDL 56 08/29/2022 08:49 AM    HDL 56 11/15/2018 12:00 AM    HDL 48 05/10/2017 07:05 AM [address] 107 08/29/2022 08:49 AM    LDLCALC 99 11/15/2018 12:00 AM    LDLCALC 101 05/10/2017 07:05 AM    GLUCOSE 92 08/29/2022 08:49 AM    GLUCOSE 88 05/10/2017 07:05 AM    LABA1C 5.1 08/29/2022 08:49 AM    LABA1C 5.3 11/15/2018 12:00 AM    LABA1C 5.4 12/28/2012 11:28 AM       The 10-year ASCVD risk score (Marcella Palm, et al., 2013) is: 0.9%    Values used to calculate the score:      Age: 42 years      Sex: Male      Is Non- : No      Diabetic: No      Tobacco smoker: No      Systolic Blood Pressure: 613 mmHg      Is BP treated: No      HDL Cholesterol: 56 mg/dL      Total Cholesterol: 174 mg/dL    Immunization History   Administered Date(s) Administered    COVID-19, MODERNA Booster BLUE border, (age 18y+), IM, 50mcg/0.25mL 11/09/2021    COVID-19, PFIZER PURPLE top, DILUTE for use, (age 15 y+), 30mcg/0.3mL 12/17/2020, 01/07/2021    Hepatitis B 08/26/2013    Influenza Virus Vaccine 10/19/2018, 10/01/2019    Influenza, AFLURIA (age 1 yrs+), FLUZONE, (age 10 mo+), MDV, 0.5mL 10/23/2020    Influenza, FLUARIX, FLULAVAL, FLUZONE (age 10 mo+) AND AFLURIA, (age 1 y+), PF, 0.5mL 09/29/2020    MMR 08/26/2013    PPD Test 06/13/2013    Tdap (Boostrix, Adacel) 05/22/2013       Health Maintenance   Topic Date Due    HIV screen  Never done    Hepatitis C screen  Never done    Flu vaccine (1) 09/01/2022    Depression Screen  04/19/2023    DTaP/Tdap/Td vaccine (2 - Td or Tdap) 05/22/2023    Lipids  08/29/2027    COVID-19 Vaccine  Completed    Hepatitis A vaccine  Aged Out    Hepatitis B vaccine  Aged Out    Hib vaccine  Aged Out    Meningococcal (ACWY) vaccine  Aged Out    Pneumococcal 0-64 years Vaccine  Aged Out       Assessment & Plan   Well adult health check  Primary insomnia  -     temazepam (RESTORIL) 15 MG capsule; Take 1 capsule by mouth nightly as needed for Sleep for up to 180 days. , Disp-90 capsule, R-1Normal  Thyroid pain  -     CT SOFT TISSUE NECK WO CONTRAST;  Future  Dysphagia, unspecified type  -     CT SOFT TISSUE NECK WO CONTRAST; Future  Gastroesophageal reflux disease, unspecified whether esophagitis present  -     pantoprazole (PROTONIX) 40 MG tablet; TAKE 1 TABLET DAILY WITH BREAKFAST, Disp-90 tablet, R-3Normal    -  Healthy lifestyle discussed  -  Labs reviewed, look fine  -  Check CT soft tissue neck, will call with results and recommendations  -  If normal, consider GI eval     Return in about 1 year (around 9/7/2023) for Wellness.          --An Blackburn, DO

## 2022-09-08 ASSESSMENT — ENCOUNTER SYMPTOMS
TROUBLE SWALLOWING: 1
GASTROINTESTINAL NEGATIVE: 1
RESPIRATORY NEGATIVE: 1

## 2022-09-27 ENCOUNTER — HOSPITAL ENCOUNTER (OUTPATIENT)
Dept: CT IMAGING | Age: 42
Discharge: HOME | End: 2022-09-27
Payer: COMMERCIAL

## 2022-09-27 DIAGNOSIS — R13.10 DYSPHAGIA, UNSPECIFIED TYPE: ICD-10-CM

## 2022-09-27 DIAGNOSIS — E07.89 THYROID PAIN: ICD-10-CM

## 2022-09-27 PROCEDURE — 70490 CT SOFT TISSUE NECK W/O DYE: CPT

## 2022-10-03 ENCOUNTER — TELEPHONE (OUTPATIENT)
Dept: FAMILY MEDICINE CLINIC | Age: 42
End: 2022-10-03

## 2022-10-03 NOTE — TELEPHONE ENCOUNTER
Received a call from Guillaume Grady with [address] [place] [address] department stating that the patient /had a CT of the soft tissue neck done on 9/7/22 and today she received a denial from the insurance company. She is asking for a P2P with [employer] phone number [phone]. If you do not want to do the P2P she can refer this to their appeals department. Please advise.     Tracking # [id number]

## 2022-11-08 ENCOUNTER — HOSPITAL ENCOUNTER (OUTPATIENT)
Dept: CT IMAGING | Age: 42
Discharge: HOME | End: 2022-11-08
Payer: COMMERCIAL

## 2022-11-08 DIAGNOSIS — I87.2 PERIPHERAL VENOUS INSUFFICIENCY: ICD-10-CM

## 2022-11-08 DIAGNOSIS — I83.812 VARICOSE VEINS OF LEFT LOWER EXTREMITY WITH PAIN: ICD-10-CM

## 2022-11-08 DIAGNOSIS — I83.12 VARICOSE VEINS OF LEFT LOWER EXTREMITY WITH INFLAMMATION: ICD-10-CM

## 2022-11-08 DIAGNOSIS — Z86.718 PERSONAL HISTORY OF VENOUS THROMBOSIS AND EMBOLISM: ICD-10-CM

## 2022-11-08 PROCEDURE — 72191 CT ANGIOGRAPH PELV W/O&W/DYE: CPT

## 2022-11-08 PROCEDURE — 6360000004 HC RX CONTRAST MEDICATION: Performed by: RADIOLOGY

## 2022-11-08 RX ADMIN — IOPAMIDOL 95 ML: 755 INJECTION, SOLUTION INTRAVENOUS at 15:15

## 2022-12-07 ENCOUNTER — OFFICE VISIT (OUTPATIENT)
Dept: FAMILY MEDICINE CLINIC | Age: 42
End: 2022-12-07
Payer: COMMERCIAL

## 2022-12-07 VITALS
RESPIRATION RATE: 16 BRPM | SYSTOLIC BLOOD PRESSURE: 108 MMHG | HEART RATE: 60 BPM | BODY MASS INDEX: 26.46 KG/M2 | DIASTOLIC BLOOD PRESSURE: 70 MMHG | WEIGHT: 206.1 LBS

## 2022-12-07 DIAGNOSIS — R10.10 UPPER ABDOMINAL PAIN: Primary | ICD-10-CM

## 2022-12-07 PROCEDURE — 99213 OFFICE O/P EST LOW 20 MIN: CPT | Performed by: FAMILY MEDICINE

## 2022-12-07 ASSESSMENT — ENCOUNTER SYMPTOMS
ABDOMINAL PAIN: 1
RESPIRATORY NEGATIVE: 1

## 2022-12-07 NOTE — PROGRESS NOTES
Johnathan Chopra (:  1980) is a 42 y.o. male,Established patient, here for evaluation of the following chief complaint(s):  Abdominal Pain (Abdominal pain for the past couple weeks, worse the past week. Pain is only when stomach is pressed on)        Subjective   SUBJECTIVE/OBJECTIVE:  HPI:    Chief Complaint   Patient presents with    Abdominal Pain     Abdominal pain for the past couple weeks, worse the past week. Pain is only when stomach is pressed on     Pt presents today with c/o mid-epigastric abd pain for the last several weeks. Pain only with palpation. Occasional nausea, chronic in nature. GERD controlled on Protonix and Pepcid. Has recent EGD that looked ok. BMs regular. Wt Readings from Last 3 Encounters:   22 206 lb 1.6 oz (93.5 kg)   22 198 lb 1.6 oz (89.9 kg)   22 196 lb 14.4 oz (89.3 kg)       Patient Active Problem List   Diagnosis    DVT, lower extremity (HCC)    GERD (gastroesophageal reflux disease)    Long term current use of anticoagulant therapy    Chronic intractable headache    Vertigo, benign positional    Eye strain, bilateral     Past Surgical History:   Procedure Laterality Date    ANUS SURGERY      Fissure    APPENDECTOMY  2015    COLONOSCOPY      VEIN SURGERY Left 10/18/2022     Social History     Tobacco Use    Smoking status: Never    Smokeless tobacco: Never   Vaping Use    Vaping Use: Never used   Substance Use Topics    Alcohol use: No    Drug use: No         Review of Systems   Constitutional: Negative. HENT: Negative. Respiratory: Negative. Cardiovascular: Negative. Gastrointestinal:  Positive for abdominal pain. Musculoskeletal: Negative. All other systems reviewed and are negative. Objective   Physical Exam  Vitals and nursing note reviewed. Constitutional:       General: He is not in acute distress. Appearance: Normal appearance. He is well-developed.    HENT:      Head: Normocephalic and atraumatic. Right Ear: Tympanic membrane normal.      Left Ear: Tympanic membrane normal.   Eyes:      Conjunctiva/sclera: Conjunctivae normal.   Cardiovascular:      Rate and Rhythm: Normal rate and regular rhythm. Heart sounds: Normal heart sounds. No murmur heard. Pulmonary:      Effort: Pulmonary effort is normal.      Breath sounds: Normal breath sounds. No wheezing, rhonchi or rales. Abdominal:      General: There is no distension. Musculoskeletal:      Cervical back: Neck supple. Skin:     General: Skin is warm and dry. Findings: No rash (on exposed surfaces). Neurological:      General: No focal deficit present. Mental Status: He is alert. Psychiatric:         Attention and Perception: Attention normal.         Mood and Affect: Mood normal.         Speech: Speech normal.         Behavior: Behavior normal. Behavior is cooperative. Thought Content: Thought content normal.         Judgment: Judgment normal.             ASSESSMENT/PLAN:  1. Upper abdominal pain    -  Uncertain etiology  -  Question strain vs small incisional hernia?  -  Will monitor    Return if symptoms worsen or fail to improve. An electronic signature was used to authenticate this note.     --Jonell Osler, DO

## 2023-02-07 ENCOUNTER — TELEMEDICINE (OUTPATIENT)
Dept: FAMILY MEDICINE CLINIC | Age: 43
End: 2023-02-07
Payer: COMMERCIAL

## 2023-02-07 DIAGNOSIS — R39.11 URINARY HESITANCY: Primary | ICD-10-CM

## 2023-02-07 DIAGNOSIS — Z12.5 SCREENING FOR PROSTATE CANCER: ICD-10-CM

## 2023-02-07 PROCEDURE — 99213 OFFICE O/P EST LOW 20 MIN: CPT | Performed by: FAMILY MEDICINE

## 2023-02-07 RX ORDER — TAMSULOSIN HYDROCHLORIDE 0.4 MG/1
0.4 CAPSULE ORAL DAILY
Qty: 30 CAPSULE | Refills: 0 | Status: SHIPPED | OUTPATIENT
Start: 2023-02-07

## 2023-02-07 SDOH — ECONOMIC STABILITY: HOUSING INSECURITY
IN THE LAST 12 MONTHS, WAS THERE A TIME WHEN YOU DID NOT HAVE A STEADY PLACE TO SLEEP OR SLEPT IN A SHELTER (INCLUDING NOW)?: NO

## 2023-02-07 SDOH — ECONOMIC STABILITY: FOOD INSECURITY: WITHIN THE PAST 12 MONTHS, YOU WORRIED THAT YOUR FOOD WOULD RUN OUT BEFORE YOU GOT MONEY TO BUY MORE.: NEVER TRUE

## 2023-02-07 SDOH — ECONOMIC STABILITY: INCOME INSECURITY: HOW HARD IS IT FOR YOU TO PAY FOR THE VERY BASICS LIKE FOOD, HOUSING, MEDICAL CARE, AND HEATING?: NOT VERY HARD

## 2023-02-07 SDOH — ECONOMIC STABILITY: TRANSPORTATION INSECURITY
IN THE PAST 12 MONTHS, HAS LACK OF TRANSPORTATION KEPT YOU FROM MEETINGS, WORK, OR FROM GETTING THINGS NEEDED FOR DAILY LIVING?: NO

## 2023-02-07 SDOH — ECONOMIC STABILITY: FOOD INSECURITY: WITHIN THE PAST 12 MONTHS, THE FOOD YOU BOUGHT JUST DIDN'T LAST AND YOU DIDN'T HAVE MONEY TO GET MORE.: NEVER TRUE

## 2023-02-07 ASSESSMENT — ENCOUNTER SYMPTOMS
RESPIRATORY NEGATIVE: 1
GASTROINTESTINAL NEGATIVE: 1

## 2023-02-07 NOTE — PROGRESS NOTES
Annie Johnson (:  1980) is a Established patient, here for evaluation of the following:         Subjective   HPI:    Chief Complaint   Patient presents with    Urinary Frequency       Pt presents today for VV to discuss urinary issues. For several years, has had urinary hesitancy and weak stream.    Will wait 20-30 seconds at times to go. Denies hematuria, dysuria. 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 Patient Active Problem List   Diagnosis    DVT, lower extremity (HCC)    GERD (gastroesophageal reflux disease)    Long term current use of anticoagulant therapy    Chronic intractable headache    Vertigo, benign positional    Eye strain, bilateral     Past Surgical History:   Procedure Laterality Date    ANUS SURGERY      Fissure    APPENDECTOMY  2015    COLONOSCOPY      VEIN SURGERY Left 10/18/2022     Social History     Tobacco Use    Smoking status: Never    Smokeless tobacco: Never   Vaping Use    Vaping Use: Never used   Substance Use Topics    Alcohol use: No    Drug use: No       Review of Systems   Constitutional: Negative. HENT: Negative. Respiratory: Negative. Cardiovascular: Negative. Gastrointestinal: Negative. Genitourinary:  Positive for difficulty urinating. Musculoskeletal: Negative. All other systems reviewed and are negative. Objective   Patient-Reported Vitals  No data recorded     Physical Exam  Constitutional:       General: He is not in acute distress. Appearance: Normal appearance. He is well-developed. He is not ill-appearing. HENT:      Head: Normocephalic and atraumatic. Right Ear: External ear normal.      Left Ear: External ear normal.   Eyes:      Conjunctiva/sclera: Conjunctivae normal.   Pulmonary:      Effort: Pulmonary effort is normal. No respiratory distress. Skin:     Findings: No rash (on exposed surfaces). Neurological:      Mental Status: He is alert and oriented to person, place, and time. Psychiatric:         Mood and Affect: Mood normal.         Behavior: Behavior normal.         Thought Content: Thought content normal.         Judgment: Judgment normal.     Assessment & Plan   Below is the assessment and plan developed based on review of pertinent history, physical exam, labs, studies, and medications. 1. Urinary hesitancy  -     tamsulosin (FLOMAX) 0.4 MG capsule; Take 1 capsule by mouth daily, Disp-30 capsule, R-0Normal  2. Screening for prostate cancer  -     PSA Screening; Future    -  On finasteride 1 mg daily already, discussed possible need to increase to 5 mg  -  Start Flomax  -  Check PSA for completeness    Return if symptoms worsen or fail to improve. Consider Urology eval.             Farzad Anthony, was evaluated through a synchronous (real-time) audio-video encounter. The patient (or guardian if applicable) is aware that this is a billable service, which includes applicable co-pays. This Virtual Visit was conducted with patient's (and/or legal guardian's) consent. The visit was conducted pursuant to the emergency declaration under the [facility], [address] [place] [address] [facility] authority and the [employer] General Act. Patient identification was verified, and a caregiver was present when appropriate.    The patient was located at Home: [address] [facility]  [address] [address]  Provider was located at [place] [address] ([place] [address]): [address] [facility],  [address] [facility],

## 2023-02-16 ENCOUNTER — NURSE ONLY (OUTPATIENT)
Dept: LAB | Age: 43
End: 2023-02-16

## 2023-02-16 DIAGNOSIS — Z12.5 SCREENING FOR PROSTATE CANCER: ICD-10-CM

## 2023-02-16 LAB — PSA SERPL-MCNC: 0.13 NG/ML (ref 0–1)

## 2023-10-23 ENCOUNTER — OFFICE VISIT (OUTPATIENT)
Dept: FAMILY MEDICINE CLINIC | Age: 43
End: 2023-10-23
Payer: COMMERCIAL

## 2023-10-23 VITALS
WEIGHT: 209.5 LBS | HEART RATE: 60 BPM | RESPIRATION RATE: 16 BRPM | SYSTOLIC BLOOD PRESSURE: 122 MMHG | BODY MASS INDEX: 26.89 KG/M2 | HEIGHT: 74 IN | DIASTOLIC BLOOD PRESSURE: 80 MMHG

## 2023-10-23 DIAGNOSIS — Z23 NEED FOR INFLUENZA VACCINATION: ICD-10-CM

## 2023-10-23 DIAGNOSIS — L64.9 ANDROGENETIC ALOPECIA: ICD-10-CM

## 2023-10-23 DIAGNOSIS — F51.01 PRIMARY INSOMNIA: ICD-10-CM

## 2023-10-23 DIAGNOSIS — K21.9 GASTROESOPHAGEAL REFLUX DISEASE, UNSPECIFIED WHETHER ESOPHAGITIS PRESENT: ICD-10-CM

## 2023-10-23 DIAGNOSIS — Z00.00 WELL ADULT HEALTH CHECK: Primary | ICD-10-CM

## 2023-10-23 PROCEDURE — 99396 PREV VISIT EST AGE 40-64: CPT | Performed by: FAMILY MEDICINE

## 2023-10-23 PROCEDURE — 90674 CCIIV4 VAC NO PRSV 0.5 ML IM: CPT | Performed by: FAMILY MEDICINE

## 2023-10-23 PROCEDURE — 90471 IMMUNIZATION ADMIN: CPT | Performed by: FAMILY MEDICINE

## 2023-10-23 RX ORDER — FINASTERIDE 1 MG/1
1 TABLET, FILM COATED ORAL DAILY
Qty: 90 TABLET | Refills: 3 | Status: SHIPPED | OUTPATIENT
Start: 2023-10-23

## 2023-10-23 RX ORDER — FINASTERIDE 1 MG/1
1 TABLET, FILM COATED ORAL DAILY
Qty: 90 TABLET | Refills: 3 | Status: SHIPPED | OUTPATIENT
Start: 2023-10-23 | End: 2023-10-23 | Stop reason: SDUPTHER

## 2023-10-23 ASSESSMENT — ENCOUNTER SYMPTOMS
RESPIRATORY NEGATIVE: 1
GASTROINTESTINAL NEGATIVE: 1

## 2023-10-23 ASSESSMENT — PATIENT HEALTH QUESTIONNAIRE - PHQ9
SUM OF ALL RESPONSES TO PHQ QUESTIONS 1-9: 0
SUM OF ALL RESPONSES TO PHQ QUESTIONS 1-9: 0
2. FEELING DOWN, DEPRESSED OR HOPELESS: NOT AT ALL
1. LITTLE INTEREST OR PLEASURE IN DOING THINGS: NOT AT ALL
SUM OF ALL RESPONSES TO PHQ QUESTIONS 1-9: 0
SUM OF ALL RESPONSES TO PHQ9 QUESTIONS 1 & 2: 0
SUM OF ALL RESPONSES TO PHQ QUESTIONS 1-9: 0
1. LITTLE INTEREST OR PLEASURE IN DOING THINGS: 0
2. FEELING DOWN, DEPRESSED OR HOPELESS: 0
SUM OF ALL RESPONSES TO PHQ9 QUESTIONS 1 & 2: 0

## 2023-10-26 DIAGNOSIS — K21.9 GASTROESOPHAGEAL REFLUX DISEASE, UNSPECIFIED WHETHER ESOPHAGITIS PRESENT: ICD-10-CM

## 2023-10-26 DIAGNOSIS — F51.01 PRIMARY INSOMNIA: ICD-10-CM

## 2023-10-26 RX ORDER — PANTOPRAZOLE SODIUM 40 MG/1
TABLET, DELAYED RELEASE ORAL
Qty: 90 TABLET | Refills: 3 | Status: SHIPPED | OUTPATIENT
Start: 2023-10-26

## 2023-10-26 RX ORDER — TEMAZEPAM 15 MG/1
15 CAPSULE ORAL NIGHTLY
Qty: 90 CAPSULE | Refills: 1 | Status: SHIPPED | OUTPATIENT
Start: 2023-10-26 | End: 2024-04-23

## 2023-10-26 NOTE — TELEPHONE ENCOUNTER
Patient requesting refill of Protonix and Temazepam to [facility].   Please refill if appropriate

## 2023-11-17 ENCOUNTER — HOSPITAL ENCOUNTER (EMERGENCY)
Age: 43
Discharge: HOME | End: 2023-11-17
Payer: COMMERCIAL

## 2023-11-17 VITALS
HEIGHT: 74 IN | BODY MASS INDEX: 26.44 KG/M2 | RESPIRATION RATE: 16 BRPM | DIASTOLIC BLOOD PRESSURE: 86 MMHG | WEIGHT: 206 LBS | OXYGEN SATURATION: 99 % | TEMPERATURE: 96.8 F | SYSTOLIC BLOOD PRESSURE: 139 MMHG | HEART RATE: 56 BPM

## 2023-11-17 DIAGNOSIS — J06.9 VIRAL UPPER RESPIRATORY ILLNESS: Primary | ICD-10-CM

## 2023-11-17 LAB
FLUAV AG SPEC QL: NEGATIVE
FLUBV AG SPEC QL: NEGATIVE
SARS-COV-2 RDRP RESP QL NAA+PROBE: NOT  DETECTED

## 2023-11-17 PROCEDURE — 87635 SARS-COV-2 COVID-19 AMP PRB: CPT

## 2023-11-17 PROCEDURE — 99203 OFFICE O/P NEW LOW 30 MIN: CPT

## 2023-11-17 PROCEDURE — 99213 OFFICE O/P EST LOW 20 MIN: CPT

## 2023-11-17 PROCEDURE — 87804 INFLUENZA ASSAY W/OPTIC: CPT

## 2023-11-17 RX ORDER — ACETAMINOPHEN 325 MG/1
650 TABLET ORAL EVERY 6 HOURS PRN
COMMUNITY

## 2023-11-17 ASSESSMENT — ENCOUNTER SYMPTOMS
VOMITING: 0
RHINORRHEA: 0
COUGH: 0
SHORTNESS OF BREATH: 0
EYE DISCHARGE: 0
EYE REDNESS: 0
TROUBLE SWALLOWING: 0
NAUSEA: 0
DIARRHEA: 0
SORE THROAT: 0

## 2023-11-17 ASSESSMENT — PAIN DESCRIPTION - LOCATION: LOCATION: HEAD

## 2023-11-17 ASSESSMENT — PAIN - FUNCTIONAL ASSESSMENT
PAIN_FUNCTIONAL_ASSESSMENT: 0-10
PAIN_FUNCTIONAL_ASSESSMENT: PREVENTS OR INTERFERES SOME ACTIVE ACTIVITIES AND ADLS

## 2023-11-17 ASSESSMENT — PAIN SCALES - GENERAL: PAINLEVEL_OUTOF10: 2

## 2023-11-17 ASSESSMENT — PAIN DESCRIPTION - PAIN TYPE: TYPE: ACUTE PAIN

## 2023-11-17 ASSESSMENT — PAIN DESCRIPTION - DESCRIPTORS: DESCRIPTORS: ACHING

## 2023-11-17 NOTE — ED PROVIDER NOTES
[address]  Urgent Care Encounter      CHIEF COMPLAINT       Chief Complaint   Patient presents with    Nausea     Headache, body ache       Nurses Notes reviewed and I agree except as noted in the HPI. HISTORY OF PRESENT ILLNESS   Lakesha Aponte is a 36 y.o. male who presents for evaluation of vertigo, headache, and body ache. Reports his symptoms appear to 2 AM this morning. Denies any fever, chills. Vertigo sensation is more of a sensation of feeling off than dizziness or room spinning. Patient reports he just does not want to be getting any family member sick with the upcoming holiday, and would like to check off the big things. Endorses in some generalized fatigue. REVIEW OF SYSTEMS     Review of Systems   Constitutional:  Positive for fatigue. Negative for chills, diaphoresis and fever. HENT:  Negative for congestion, ear pain, rhinorrhea, sore throat and trouble swallowing. Eyes:  Negative for discharge and redness. Respiratory:  Negative for cough and shortness of breath. Cardiovascular:  Negative for chest pain. Gastrointestinal:  Negative for diarrhea, nausea and vomiting. Genitourinary:  Negative for decreased urine volume. Musculoskeletal:  Negative for neck pain and neck stiffness. Skin:  Negative for rash. Neurological:  Positive for dizziness and headaches. Hematological:  Negative for adenopathy. Psychiatric/Behavioral:  Negative for sleep disturbance. PAST MEDICAL HISTORY         Diagnosis Date    DVT (deep venous thrombosis) ([address]) 2011    DVT (deep venous thrombosis) ([address]) 10/2022    GERD (gastroesophageal reflux disease)     Headache     nigraines    Orthostasis     Reflux        SURGICAL HISTORY     Patient  has a past surgical history that includes Anus surgery; Appendectomy (02/26/2015); Colonoscopy; and Vein Surgery (Left, 10/18/2022).     CURRENT MEDICATIONS       Discharge Medication List as of 11/17/2023  1:39 PM

## 2023-11-17 NOTE — ED TRIAGE NOTES
Patient ambulated to room with complaint of headache, nausea that started about 2am today. States he completed a home covid test that was negative.

## 2023-11-17 NOTE — DISCHARGE INSTRUCTIONS
Symptoms are likely viral in nature, benign and self-limiting. Symptoms may be present for up to 7 to 10 days. Patient is encouraged to use over-the-counter occasions for symptoms such as Zyrtec, Flonase, and Mucinex D. Should have good hand hygiene and cover mouth when coughing. Instructed use over-the-counter Tylenol and Motrin for pain or fever. Should follow-up with PCP in 3 to 5 days and worsening symptoms.

## 2023-11-20 ENCOUNTER — TELEPHONE (OUTPATIENT)
Dept: FAMILY MEDICINE CLINIC | Age: 43
End: 2023-11-20

## 2024-03-11 ENCOUNTER — APPOINTMENT (OUTPATIENT)
Dept: INTERVENTIONAL RADIOLOGY/VASCULAR | Age: 44
End: 2024-03-11
Payer: COMMERCIAL

## 2024-03-11 ENCOUNTER — APPOINTMENT (OUTPATIENT)
Dept: CT IMAGING | Age: 44
End: 2024-03-11
Payer: COMMERCIAL

## 2024-03-11 ENCOUNTER — HOSPITAL ENCOUNTER (EMERGENCY)
Age: 44
Discharge: HOME | End: 2024-03-11
Payer: COMMERCIAL

## 2024-03-11 VITALS
OXYGEN SATURATION: 100 % | DIASTOLIC BLOOD PRESSURE: 100 MMHG | SYSTOLIC BLOOD PRESSURE: 149 MMHG | HEART RATE: 52 BPM | RESPIRATION RATE: 16 BRPM | BODY MASS INDEX: 26.05 KG/M2 | HEIGHT: 74 IN | TEMPERATURE: 97.6 F | WEIGHT: 203 LBS

## 2024-03-11 DIAGNOSIS — M79.605 LEFT LEG PAIN: Primary | ICD-10-CM

## 2024-03-11 PROCEDURE — 99284 EMERGENCY DEPT VISIT MOD MDM: CPT

## 2024-03-11 PROCEDURE — 93971 EXTREMITY STUDY: CPT

## 2024-03-11 PROCEDURE — 70450 CT HEAD/BRAIN W/O DYE: CPT

## 2024-03-11 ASSESSMENT — PAIN DESCRIPTION - DESCRIPTORS: DESCRIPTORS: HEAVINESS

## 2024-03-11 ASSESSMENT — PAIN DESCRIPTION - PAIN TYPE: TYPE: ACUTE PAIN

## 2024-03-11 ASSESSMENT — PAIN DESCRIPTION - ORIENTATION: ORIENTATION: LEFT;LOWER

## 2024-03-11 ASSESSMENT — PAIN SCALES - GENERAL: PAINLEVEL_OUTOF10: 2

## 2024-03-11 ASSESSMENT — PAIN DESCRIPTION - LOCATION: LOCATION: LEG

## 2024-03-11 ASSESSMENT — PAIN - FUNCTIONAL ASSESSMENT: PAIN_FUNCTIONAL_ASSESSMENT: 0-10

## 2024-03-11 NOTE — ED TRIAGE NOTES
Patient presents to ER with complaints of left lower leg pain that started over this past weekend. Patient reports he is on Eliquis and hit his head on a monitor this past week. Patient reports due to head injury he stopped taking Eliquis for 2 days and developed left lower leg pain. Patient reports taking dose of Eliquis last night and today.

## 2024-03-12 ENCOUNTER — PATIENT MESSAGE (OUTPATIENT)
Dept: FAMILY MEDICINE CLINIC | Age: 44
End: 2024-03-12

## 2024-03-17 NOTE — ED PROVIDER NOTES
[facility] EMERGENCY DEPT      EMERGENCY MEDICINE     Pt Name: Ramon Martinez  MRN: [id number]  Birthdate 1980  Date of evaluation: 3/11/2024  Provider: JOHNY Newman CNP    CHIEF COMPLAINT       Chief Complaint   Patient presents with    Leg Pain     Left lower     HISTORY OF PRESENT ILLNESS   Ramon Martinez is a pleasant 43 y.o. male who presents to the emergency department from home with c/o left leg pain.  The patient states he has a hx of unprovoked clots.  He hit his head over the weekend and opted to skip his thinner for two days.  (Patient is a pharmacist and a physician).  He states he then developed left lower leg pain.  States he is concerned he has a clot.  Denies other complaints.  NO SOB.  Denies signs of ICH but would like to proceed with head CT given if DVT study is positive, we would need it.      History is obtained from:  patient  PASTMEDICAL HISTORY     Past Medical History:   Diagnosis Date    DVT (deep venous thrombosis) ([facility]) 2011    DVT (deep venous thrombosis) ([facility]) 10/2022    GERD (gastroesophageal reflux disease)     Headache     nigraines    Orthostasis     Reflux        Patient Active Problem List   Diagnosis Code    DVT, lower extremity ([facility]) I82.409    GERD (gastroesophageal reflux disease) K21.9    Long term current use of anticoagulant therapy Z79.01    Chronic intractable headache R51.9, G89.29    Vertigo, benign positional H81.10    Eye strain, bilateral H53.10     SURGICAL HISTORY       Past Surgical History:   Procedure Laterality Date    ANUS SURGERY      Fissure    APPENDECTOMY  02/26/2015    COLONOSCOPY      VEIN SURGERY Left 10/18/2022       CURRENT MEDICATIONS       Discharge Medication List as of 3/11/2024  7:44 PM        CONTINUE these medications which have NOT CHANGED    Details   acetaminophen (TYLENOL) 325 MG tablet Take 2 tablets by mouth every 6 hours as needed for PainHistorical Med      pantoprazole (PROTONIX) 40 MG tablet TAKE 1 TABLET DAILY

## 2024-06-24 ENCOUNTER — NURSE ONLY (OUTPATIENT)
Dept: LAB | Age: 44
End: 2024-06-24

## 2024-06-24 LAB
ERYTHROCYTE [SEDIMENTATION RATE] IN BLOOD BY WESTERGREN METHOD: 15 MM/HR (ref 0–10)
RHEUMATOID FACT SERPL-ACNC: < 10 IU/ML (ref 0–13)
URATE SERPL-MCNC: 4.6 MG/DL (ref 3.7–7)

## 2024-06-26 LAB
CARDIOLIPIN IGG SER IA-ACNC: < 10 GPL
CARDIOLIPIN IGM SER IA-ACNC: < 10 MPL
CYCLIC CITRULLINATED PEPTIDE ANTIBODY IGG: 1.2 U/ML (ref 0–7)
HLA-B27 QL FC: NEGATIVE
NUCLEAR IGG SER QL IA: DETECTED

## 2024-06-27 LAB
ANA PAT SER IF-IMP: NORMAL
NUCLEAR IGG SER QL IF: NORMAL

## 2024-10-21 DIAGNOSIS — K21.9 GASTROESOPHAGEAL REFLUX DISEASE, UNSPECIFIED WHETHER ESOPHAGITIS PRESENT: ICD-10-CM

## 2024-10-21 RX ORDER — PANTOPRAZOLE SODIUM 40 MG/1
TABLET, DELAYED RELEASE ORAL
Qty: 90 TABLET | Refills: 3 | Status: SHIPPED | OUTPATIENT
Start: 2024-10-21 | End: 2024-10-22 | Stop reason: SDUPTHER

## 2024-10-22 ENCOUNTER — OFFICE VISIT (OUTPATIENT)
Dept: FAMILY MEDICINE CLINIC | Age: 44
End: 2024-10-22
Payer: COMMERCIAL

## 2024-10-22 VITALS
SYSTOLIC BLOOD PRESSURE: 132 MMHG | BODY MASS INDEX: 25.89 KG/M2 | HEART RATE: 60 BPM | HEIGHT: 74 IN | WEIGHT: 201.7 LBS | RESPIRATION RATE: 16 BRPM | DIASTOLIC BLOOD PRESSURE: 70 MMHG

## 2024-10-22 DIAGNOSIS — Z86.718 HISTORY OF DVT (DEEP VEIN THROMBOSIS): ICD-10-CM

## 2024-10-22 DIAGNOSIS — F51.01 PRIMARY INSOMNIA: ICD-10-CM

## 2024-10-22 DIAGNOSIS — Z23 NEED FOR INFLUENZA VACCINATION: ICD-10-CM

## 2024-10-22 DIAGNOSIS — Z00.00 WELL ADULT HEALTH CHECK: Primary | ICD-10-CM

## 2024-10-22 DIAGNOSIS — K21.9 GASTROESOPHAGEAL REFLUX DISEASE, UNSPECIFIED WHETHER ESOPHAGITIS PRESENT: ICD-10-CM

## 2024-10-22 DIAGNOSIS — L64.9 ANDROGENETIC ALOPECIA: ICD-10-CM

## 2024-10-22 PROCEDURE — 90661 CCIIV3 VAC ABX FR 0.5 ML IM: CPT | Performed by: FAMILY MEDICINE

## 2024-10-22 PROCEDURE — 99396 PREV VISIT EST AGE 40-64: CPT | Performed by: FAMILY MEDICINE

## 2024-10-22 PROCEDURE — 90471 IMMUNIZATION ADMIN: CPT | Performed by: FAMILY MEDICINE

## 2024-10-22 RX ORDER — TEMAZEPAM 15 MG/1
15 CAPSULE ORAL NIGHTLY PRN
Qty: 90 CAPSULE | Refills: 1 | Status: SHIPPED | OUTPATIENT
Start: 2024-10-22 | End: 2025-04-20

## 2024-10-22 RX ORDER — PANTOPRAZOLE SODIUM 40 MG/1
TABLET, DELAYED RELEASE ORAL
Qty: 90 TABLET | Refills: 3 | Status: SHIPPED | OUTPATIENT
Start: 2024-10-22

## 2024-10-22 SDOH — ECONOMIC STABILITY: FOOD INSECURITY: WITHIN THE PAST 12 MONTHS, THE FOOD YOU BOUGHT JUST DIDN'T LAST AND YOU DIDN'T HAVE MONEY TO GET MORE.: NEVER TRUE

## 2024-10-22 SDOH — ECONOMIC STABILITY: FOOD INSECURITY: WITHIN THE PAST 12 MONTHS, YOU WORRIED THAT YOUR FOOD WOULD RUN OUT BEFORE YOU GOT MONEY TO BUY MORE.: NEVER TRUE

## 2024-10-22 SDOH — ECONOMIC STABILITY: INCOME INSECURITY: HOW HARD IS IT FOR YOU TO PAY FOR THE VERY BASICS LIKE FOOD, HOUSING, MEDICAL CARE, AND HEATING?: NOT HARD AT ALL

## 2024-10-22 ASSESSMENT — ENCOUNTER SYMPTOMS
GASTROINTESTINAL NEGATIVE: 1
RESPIRATORY NEGATIVE: 1

## 2024-10-22 ASSESSMENT — PATIENT HEALTH QUESTIONNAIRE - PHQ9
SUM OF ALL RESPONSES TO PHQ QUESTIONS 1-9: 0
SUM OF ALL RESPONSES TO PHQ QUESTIONS 1-9: 0
2. FEELING DOWN, DEPRESSED OR HOPELESS: NOT AT ALL
2. FEELING DOWN, DEPRESSED OR HOPELESS: NOT AT ALL
1. LITTLE INTEREST OR PLEASURE IN DOING THINGS: NOT AT ALL
SUM OF ALL RESPONSES TO PHQ QUESTIONS 1-9: 0
SUM OF ALL RESPONSES TO PHQ9 QUESTIONS 1 & 2: 0
SUM OF ALL RESPONSES TO PHQ QUESTIONS 1-9: 0
SUM OF ALL RESPONSES TO PHQ9 QUESTIONS 1 & 2: 0
1. LITTLE INTEREST OR PLEASURE IN DOING THINGS: NOT AT ALL

## 2024-10-22 NOTE — PROGRESS NOTES
After obtaining consent, and per orders of Dr. Zelaya, injection of Flucelvax 0.5ml given in Left deltoid by Edwige Barrientos CMA ([facility]). Patient instructed to report any adverse reaction to me immediately. Pt tolerated injection well.     Immunizations Administered       Name Date Dose Route    Influenza, FLUCELVAX, (age 6 mo+) IM, Trivalent PF, 0.5mL 10/22/2024 0.5 mL Intramuscular    Site: Deltoid- Left    Lot: [id number]    NDC: 69367-275-71            
Influenza, FLUCELVAX Trivalent, (age 6 mo+) IM, Preservative Free, 0.5mL    -  Healthy lifestyle discussed  -  Chronic medical problems stable  -  Continue current medications  -  Follow up with specialists as scheduled  -  Flu shot today    Return in about 1 year (around 10/22/2025) for Annual .             An electronic signature was used to authenticate this note.    --Ananth Zelaya, DO

## 2025-01-22 ENCOUNTER — TELEPHONE (OUTPATIENT)
Dept: FAMILY MEDICINE CLINIC | Age: 45
End: 2025-01-22

## 2025-01-22 NOTE — TELEPHONE ENCOUNTER
Notified [facility] of above. Patient also notified off above and verbalized understanding.

## 2025-01-22 NOTE — TELEPHONE ENCOUNTER
Outcome  Approved today by [facility] 2017  CaseId:[id number];Status:Approved;Review Type:Prior Auth;Coverage Start Date:01/01/2025;Coverage End Date:01/22/2026;  Authorization Expiration Date: 1/21/2026

## 2025-01-22 NOTE — TELEPHONE ENCOUNTER
We received a PA request for the Eliquis through [facility]. This was submitted and is pending at this time.

## 2025-06-18 ENCOUNTER — PATIENT MESSAGE (OUTPATIENT)
Dept: FAMILY MEDICINE CLINIC | Age: 45
End: 2025-06-18

## 2025-06-18 DIAGNOSIS — R51.9 NEW ONSET OF HEADACHES: Primary | ICD-10-CM

## 2025-06-18 DIAGNOSIS — H53.9 VISUAL DISTURBANCE: ICD-10-CM

## 2025-06-23 ENCOUNTER — PATIENT MESSAGE (OUTPATIENT)
Dept: FAMILY MEDICINE CLINIC | Age: 45
End: 2025-06-23

## 2025-06-23 DIAGNOSIS — R51.9 NEW ONSET OF HEADACHES: Primary | ICD-10-CM

## 2025-06-23 DIAGNOSIS — H53.9 VISUAL DISTURBANCE: ICD-10-CM

## 2025-06-30 ENCOUNTER — HOSPITAL ENCOUNTER (OUTPATIENT)
Dept: NEUROLOGY | Age: 45
Discharge: HOME | End: 2025-06-30
Attending: FAMILY MEDICINE
Payer: COMMERCIAL

## 2025-06-30 DIAGNOSIS — R51.9 NEW ONSET OF HEADACHES: ICD-10-CM

## 2025-06-30 DIAGNOSIS — H53.9 VISUAL DISTURBANCE: ICD-10-CM

## 2025-06-30 PROCEDURE — 95816 EEG AWAKE AND DROWSY: CPT | Performed by: PSYCHIATRY & NEUROLOGY

## 2025-06-30 PROCEDURE — 95816 EEG AWAKE AND DROWSY: CPT

## 2025-06-30 NOTE — PROGRESS NOTES
[facility]  Neurodiagnostic Laboratory Technician Report  STRZ EEG  Routine, Standard Test    Name: Ramon Martinez  : 1980  Age: 44 y.o.  Sex: male  MRN: [id number]  CSN: [id number]    Ordering Provider: Ananth Zelaya DO       EEG Number: [id number]    Time In: Time In: 1117 (25)  Time Out: Time Out: 1142 (2025)  Total Treatment Time: Minutes: 25          Clinical History: headaches, visual disturbance- static vision,  hx LASIK surgery, with worsening symptoms   Ct  IMPRESSION:     1. Negative noncontrast CT scan of the brain.  2. Postoperative changes in the right globe..       Past Medical History:       Diagnosis Date    DVT (deep venous thrombosis) ([facility])     DVT (deep venous thrombosis) ([facility]) 10/2022    GERD (gastroesophageal reflux disease)     Headache     nigraines    Orthostasis     Reflux        Medications:   Prior to Admission medications    Medication Sig Start Date End Date Taking? Authorizing Provider   pantoprazole (PROTONIX) 40 MG tablet TAKE 1 TABLET DAILY WITH BREAKFAST 10/22/24   Ananth Zelaya DO   apixaban (ELIQUIS) 5 MG TABS tablet Take 1 tablet by mouth 2 times daily 10/22/24   Ananth Zelaya DO   acetaminophen (TYLENOL) 325 MG tablet Take 2 tablets by mouth every 6 hours as needed for Pain    ProviderDavide MD   finasteride (PROPECIA) 1 MG tablet Take 1 tablet by mouth daily 10/23/23   Ananth Zelaya DO   VITAMIN D PO Take 5,000 Units by mouth daily    Provider, MD Davide   B Complex-Biotin-FA (HM VITAMIN B100 COMPLEX PO) Take by mouth    ProviderDavide MD   ibuprofen (ADVIL;MOTRIN) 600 MG tablet Take 1 tablet by mouth every 6 hours as needed for Pain    Provider, MD Davide       Hand Dominance: Right   Sedation: No   H.V. Completed: yes,with good effort   Photic: Yes   Sleep: No   Drowsy: No   Sleep Deprived: No   Seizures Observed: No   Mentality: alert     Technician: Daniella Spangler 2025

## 2025-07-02 NOTE — PROCEDURES
[address] [address]                       ELECTROENCEPHALOGRAM REPORT      PATIENT NAME: MAU BROUSSARD             : 1980  MED REC NO: [id number]                       ROOM:   ACCOUNT NO: [id number]                       ADMIT DATE: 2025  PROVIDER: Viktor Rob MD      REFERRING PHYSICIAN:  LYDIA DUBOIS    CLINICAL HISTORY:  44-year-old male presenting with headache, visual disturbance, static vision, history of LASIK surgery with worsening symptoms.    MEDICATIONS:  Listed are Eliquis, pantoprazole, Tylenol, finasteride, vitamin D, Advil.    DESCRIPTION OF EEG:  This is a routine 20-minute EEG recording using the [employer] 10/20 system on [employer] workstation.  Automated spike and seizure detection algorithms were applied.  The patient is described as alert.    The background rhythm activity is noted to be 9-10 hertz in the posterior parietal area, symmetric, well modulated, attenuates with eye opening.  Hyperventilation was performed for 3 minutes with fair effort without abnormality.  Lead artifact and muscle artifact were noted limiting quality of data obtained.  Photic stimulation was performed with driving seen through some of the frequencies.  There was no evidence of epileptiform activity appreciated.  No clinical seizures were observed.    IMPRESSION:  This is a normal awake and drowsy EEG.  There was no evidence of epileptiform activity appreciated.  No clinical seizures were observed.          VIKTOR ROB MD      D:  2025 16:23:10     T:  2025 16:54:47     ASA/AQS  Job #:  [id number]     Doc#:  [id number]

## 2025-07-03 ENCOUNTER — RESULTS FOLLOW-UP (OUTPATIENT)
Dept: FAMILY MEDICINE CLINIC | Age: 45
End: 2025-07-03

## 2025-07-07 DIAGNOSIS — Z86.718 HISTORY OF DVT (DEEP VEIN THROMBOSIS): ICD-10-CM
